# Patient Record
Sex: FEMALE | Race: WHITE | NOT HISPANIC OR LATINO | Employment: OTHER | URBAN - METROPOLITAN AREA
[De-identification: names, ages, dates, MRNs, and addresses within clinical notes are randomized per-mention and may not be internally consistent; named-entity substitution may affect disease eponyms.]

---

## 2017-04-02 ENCOUNTER — HOSPITAL ENCOUNTER (EMERGENCY)
Facility: MEDICAL CENTER | Age: 82
End: 2017-04-02
Attending: EMERGENCY MEDICINE
Payer: MEDICARE

## 2017-04-02 ENCOUNTER — APPOINTMENT (OUTPATIENT)
Dept: RADIOLOGY | Facility: MEDICAL CENTER | Age: 82
End: 2017-04-02
Attending: EMERGENCY MEDICINE
Payer: MEDICARE

## 2017-04-02 VITALS
DIASTOLIC BLOOD PRESSURE: 89 MMHG | RESPIRATION RATE: 17 BRPM | SYSTOLIC BLOOD PRESSURE: 114 MMHG | HEIGHT: 58 IN | TEMPERATURE: 99.9 F | HEART RATE: 62 BPM | WEIGHT: 120 LBS | BODY MASS INDEX: 25.19 KG/M2

## 2017-04-02 DIAGNOSIS — S69.91XA HAND INJURIES, RIGHT, INITIAL ENCOUNTER: ICD-10-CM

## 2017-04-02 DIAGNOSIS — S62.640A CLOSED NONDISPLACED FRACTURE OF PROXIMAL PHALANX OF RIGHT INDEX FINGER, INITIAL ENCOUNTER: ICD-10-CM

## 2017-04-02 PROCEDURE — 73130 X-RAY EXAM OF HAND: CPT | Mod: RT

## 2017-04-02 PROCEDURE — 99284 EMERGENCY DEPT VISIT MOD MDM: CPT

## 2017-04-02 RX ORDER — WARFARIN SODIUM 3 MG/1
3 TABLET ORAL EVERY EVENING
COMMUNITY

## 2017-04-02 RX ORDER — DONEPEZIL HYDROCHLORIDE 5 MG/1
5 TABLET, FILM COATED ORAL NIGHTLY
COMMUNITY

## 2017-04-02 RX ORDER — SIMVASTATIN 10 MG
10 TABLET ORAL NIGHTLY
COMMUNITY

## 2017-04-02 RX ORDER — MULTIVITAMIN/IRON/FOLIC ACID 18MG-0.4MG
1 TABLET ORAL DAILY
COMMUNITY

## 2017-04-02 RX ORDER — OMEPRAZOLE 40 MG/1
40 CAPSULE, DELAYED RELEASE ORAL DAILY
COMMUNITY

## 2017-04-02 RX ORDER — FOLIC ACID 1 MG/1
1 TABLET ORAL DAILY
COMMUNITY

## 2017-04-02 RX ORDER — FUROSEMIDE 20 MG/1
20 TABLET ORAL
COMMUNITY

## 2017-04-02 ASSESSMENT — PAIN SCALES - WONG BAKER: WONGBAKER_NUMERICALRESPONSE: HURTS JUST A LITTLE BIT

## 2017-04-02 ASSESSMENT — PAIN SCALES - GENERAL: PAINLEVEL_OUTOF10: 2

## 2017-04-02 NOTE — ED AVS SNAPSHOT
4/2/2017          Kim Monzon  [address]  [place] [address]    Dear Kim:    [facility] wants to ensure your discharge home is safe and you or your loved ones have had all your questions answered regarding your care after you leave the hospital.    You may receive a telephone call within two days of your discharge.  This call is to make certain you understand your discharge instructions as well as ensure we provided you with the best care possible during your stay with us.     The call will only last approximately 3-5 minutes and will be done by a nurse.    Once again, we want to ensure your discharge home is safe and that you have a clear understanding of any next steps in your care.  If you have any questions or concerns, please do not hesitate to contact us, we are here for you.  Thank you for choosing [facility] for your healthcare needs.    Sincerely,    Terry Faulkner    [facility]

## 2017-04-02 NOTE — ED AVS SNAPSHOT
[employer] Access Code: [email or web address]  Expires: 5/2/2017 11:51 AM    Your email address is not on file at [facility] [employer].  Email Addresses are required for you to sign up for [employer], please contact [phone] to verify your personal information and to provide your email address prior to attempting to register for [employer].    Kim Monzon  [address] [place] [address]    [employer]  A secure, online tool to manage your health information     [employer]’s [employer]® is a secure, online tool that connects you to your personalized health information from the privacy of your home -- day or night - making it very easy for you to manage your healthcare. Once the activation process is completed, you can even access your medical information using the [employer] césar, which is available for free in the Apple César store or Google Play store.     To learn more about [employer], visit [email or web address]    There are two levels of access available (as shown below):   My Chart Features  [facility] Primary Care Doctor [facility]  Specialists [facility]  Urgent  Care Non-[facility] Primary Care Doctor   Email your healthcare team securely and privately 24/7 X X X    Manage appointments: schedule your next appointment; view details of past/upcoming appointments X      Request prescription refills. X      View recent personal medical records, including lab and immunizations X X X X   View health record, including health history, allergies, medications X X X X   Read reports about your outpatient visits, procedures, consult and ER notes X X X X   See your discharge summary, which is a recap of your hospital and/or ER visit that includes your diagnosis, lab results, and care plan X X  X     How to register for [employer]:  Once your e-mail address has been verified, follow the following steps to sign up for [employer].     1. Go to  [email or web address]  2. Click on the Sign Up Now box, which takes you to the New Member Sign Up page. You will need  to provide the following information:  a. Enter your [employer] Access Code exactly as it appears at the top of this page. (You will not need to use this code after you’ve completed the sign-up process. If you do not sign up before the expiration date, you must request a new code.)   b. Enter your date of birth.   c. Enter your home email address.   d. Click Submit, and follow the next screen’s instructions.  3. Create a [employer] ID. This will be your [employer] login ID and cannot be changed, so think of one that is secure and easy to remember.  4. Create a [employer] password. You can change your password at any time.  5. Enter your Password Reset Question and Answer. This can be used at a later time if you forget your password.   6. Enter your e-mail address. This allows you to receive e-mail notifications when new information is available in [employer].  7. Click Sign Up. You can now view your health information.    For assistance activating your [employer] account, call [phone]

## 2017-04-02 NOTE — ED NOTES
"Med rec updated and complete  Allergies reviewed per daughter  Pt states \"My daughter knows my medications\".  Pt's daughter had a list of medications and her medications, went over list and returned list back to pts daughter.  Pts daughter states \"No antibiotics in the last 30 days\".        "

## 2017-04-02 NOTE — ED NOTES
Pt amb to rm#2; c/o R hand pain, bruising r/t t5000 hand injury. Pt 'jammed' her hand while putting on her shoes last night. Swelling bruising noted to 1st-2nd knuckles/fingers. Pt on coumadin tx

## 2017-04-02 NOTE — DISCHARGE INSTRUCTIONS
Finger Fracture  Finger fractures are breaks in the bones of the fingers. There are many types of fractures. There are also different ways of treating these fractures. Your doctor will talk with you about the best way to treat your fracture.  Injury is the main cause of broken fingers. This includes:  · Injuries while playing sports.  · Workplace injuries.  · Falls.  HOME CARE  · Follow your doctor's instructions for:  ¨ Activities.  ¨ Exercises.  ¨ Physical therapy.  · Take medicines only as told by your doctor for pain, discomfort, or fever.  GET HELP IF:  You have pain or swelling that limits:  · The motion of your fingers.  · The use of your fingers.  GET HELP RIGHT AWAY IF:  · You cannot feel your fingers, or your fingers become numb.     This information is not intended to replace advice given to you by your health care provider. Make sure you discuss any questions you have with your health care provider.     Document Released: 06/05/2009 Document Revised: 01/08/2016 Document Reviewed: 07/30/2014  Help.com Interactive Patient Education ©2016 [employer] Inc.  Return if fever, vomiting or if no better in 12 hours.

## 2017-04-02 NOTE — ED NOTES
Pt and family received d/c instr; pt and daughter verbalized understanding. Pt to lobby via w/c per daughter.

## 2017-04-02 NOTE — ED AVS SNAPSHOT
Home Care Instructions                                                                                                                Kim Monzon   MRN: [id number]    Department:  [facility], Emergency Dept   Date of Visit:  4/2/2017            [facility], Emergency Dept    [address]    [place] [address]    Phone:  [phone]      You were seen by     Damaso Easley M.D.      Your Diagnosis Was     Hand injuries, right, initial encounter     S69.91XA       Follow-up Information     1. Follow up with Yogi Jennings M.D.. Schedule an appointment as soon as possible for a visit today.    Specialty:  Orthopaedics    Contact information    [address]  [address]  [place] [address]  [phone]        Medication Information     Review all of your home medications and newly ordered medications with your primary doctor and/or pharmacist as soon as possible. Follow medication instructions as directed by your doctor and/or pharmacist.     Please keep your complete medication list with you and share with your physician. Update the information when medications are discontinued, doses are changed, or new medications (including over-the-counter products) are added; and carry medication information at all times in the event of emergency situations.               Medication List      ASK your doctor about these medications        Instructions    Morning Afternoon Evening Bedtime    B-12 PO        Take 1 Tab by mouth every day.   Dose:  1 Tab                        CENTRUM ADULTS Tabs tablet        Take 1 Tab by mouth every day.   Dose:  1 Tab                        donepezil 5 MG Tabs   Commonly known as:  ARICEPT        Take 5 mg by mouth every evening.   Dose:  5 mg                        folic acid 1 MG Tabs   Commonly known as:  FOLVITE        Take 1 mg by mouth every day.   Dose:  1 mg                        furosemide 20 MG Tabs   Commonly known as:  LASIX           Take 20 mg by mouth every 48 hours.   Dose:  20 mg                        metoprolol 25 MG Tabs   Commonly known as:  LOPRESSOR        Take 25 mg by mouth 2 times a day.   Dose:  25 mg                        omeprazole 40 MG delayed-release capsule   Commonly known as:  PRILOSEC        Take 40 mg by mouth every day.   Dose:  40 mg                        simvastatin 10 MG Tabs   Commonly known as:  ZOCOR        Take 10 mg by mouth every evening.   Dose:  10 mg                        warfarin 3 MG Tabs   Commonly known as:  COUMADIN        Take 3 mg by mouth every evening.   Dose:  3 mg                                Procedures and tests performed during your visit     DX-HAND 3+ RIGHT    NURSING COMMUNICATION        Discharge Instructions       Finger Fracture  Finger fractures are breaks in the bones of the fingers. There are many types of fractures. There are also different ways of treating these fractures. Your doctor will talk with you about the best way to treat your fracture.  Injury is the main cause of broken fingers. This includes:  · Injuries while playing sports.  · Workplace injuries.  · Falls.  HOME CARE  · Follow your doctor's instructions for:  ¨ Activities.  ¨ Exercises.  ¨ Physical therapy.  · Take medicines only as told by your doctor for pain, discomfort, or fever.  GET HELP IF:  You have pain or swelling that limits:  · The motion of your fingers.  · The use of your fingers.  GET HELP RIGHT AWAY IF:  · You cannot feel your fingers, or your fingers become numb.     This information is not intended to replace advice given to you by your health care provider. Make sure you discuss any questions you have with your health care provider.     Document Released: 06/05/2009 Document Revised: 01/08/2016 Document Reviewed: 07/30/2014  [employer] Interactive Patient Education ©2016 [employer].  Return if fever, vomiting or if no better in 12 hours.          Patient Information     Patient  Information    Following emergency treatment: all patient requiring follow-up care must return either to a private physician or a clinic if your condition worsens before you are able to obtain further medical attention, please return to the emergency room.     Billing Information    At [facility], we work to make the billing process streamlined for our patients.  Our Representatives are here to answer any questions you may have regarding your hospital bill.  If you have insurance coverage and have supplied your insurance information to us, we will submit a claim to your insurer on your behalf.  Should you have any questions regarding your bill, we can be reached online or by phone as follows:  Online: You are able pay your bills online or live chat with our representatives about any billing questions you may have. We are here to help Monday - Friday from 8:00am to 7:30pm and 9:00am - 12:00pm on Saturdays.  Please visit [email or web address]  for more information.   Phone:  [phone] or [phone]    Please note that your emergency physician, surgeon, pathologist, radiologist, anesthesiologist, and other specialists are not employed by [facility] and will therefore bill separately for their services.  Please contact them directly for any questions concerning their bills at the numbers below:     Emer[facility] Physician Services:  [phone]  [facility]:  [phone]  [facility]:  [phone]  [facility]:  [phone]    1. Your final bill may vary from the amount quoted upon discharge if all procedures are not complete at that time, or if your doctor has additional procedures of which we are not aware. You will receive an additional bill if you return to the Emergency Department at [facility] for suture removal regardless of the facility of which the sutures were placed.     2. Please arrange for settlement of this account  at the emergency registration.    3. All self-pay accounts are due in full at the time of treatment.  If you are unable to meet this obligation then payment is expected within 4-5 days.     4. If you have had radiology studies (CT, X-ray, Ultrasound, MRI), you have received a preliminary result during your emergency department visit. Please contact the radiology department [phone] to receive a copy of your final result. Please discuss the Final result with your primary physician or with the follow up physician provided.     Crisis Hotline:  [place] Crisis Hotline:  8-676-HQLSSSV or 1-256.762.8724  [place] Crisis Hotline:    1-207.209.7742 or 324-836-5184         ED Discharge Follow Up Questions    1. In order to provide you with very good care, we would like to follow up with a phone call in the next few days.  May we have your permission to contact you?     YES /  NO    2. What is the best phone number to call you? (       )_____-__________    3. What is the best time to call you?      Morning  /  Afternoon  /  Evening                   Patient Signature:  ____________________________________________________________    Date:  ____________________________________________________________

## 2017-04-02 NOTE — ED PROVIDER NOTES
"ED Provider Note    CHIEF COMPLAINT  Chief Complaint   Patient presents with   • T-5000 Extremity Pain     R hand       HPI  Kim Monzon is a 89 y.o. female who presents with right hand pain, when she slipped, last night, about 12 hours ago. Struck the right hand. No loss of consciousness no neck pain no other injuries. Only right hand pain.    REVIEW OF SYSTEMS  See HPI for further details. All other systems are negative.     PAST MEDICAL HISTORY  Past Medical History   Diagnosis Date   • Hypertension    • CAD (coronary artery disease)        FAMILY HISTORY  History reviewed. No pertinent family history.    SOCIAL HISTORY  Social History     Social History   • Marital Status:      Spouse Name: N/A   • Number of Children: N/A   • Years of Education: N/A     Social History Main Topics   • Smoking status: Never Smoker    • Smokeless tobacco: None   • Alcohol Use: No   • Drug Use: No   • Sexual Activity: Not Asked     Other Topics Concern   • None     Social History Narrative   • None       SURGICAL HISTORY  History reviewed. No pertinent past surgical history.    CURRENT MEDICATIONS  Home Medications     **Home medications have not yet been reviewed for this encounter**          ALLERGIES  Allergies   Allergen Reactions   • Codeine        PHYSICAL EXAM  VITAL SIGNS: /62 mmHg  Pulse 59  Temp(Src) 37.7 °C (99.9 °F)  Resp 14  Ht 1.473 m (4' 10\")  Wt 54.432 kg (120 lb)  BMI 25.09 kg/m2  Constitutional: Well developed, Well nourished, No acute distress, Non-toxic appearance.   Extremities: Intact distal pulses examination of her right hand, tenderness ecchymoses, secondary MP joint, neurovascular is intact skin is intact, No cyanosis, No clubbing.   Musculoskeletal: Good range of motion in all major joints except right hand as above     Neurologic: Alert & oriented x 3, Normal motor function, Normal sensory function, No focal deficits noted.   Psychiatric: Affect normal, Judgment normal, Mood normal. 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 (See chart for details)    She bumped her hand accidentally, last night, x-ray demonstrates a fracture, proximal portion of the proximal phalanx, index finger. She is placed in a splint. And will follow-up with the on-call orthopedic surgeon., Dr. Jennings  FINAL IMPRESSION  1.   1. Hand injuries, right, initial encounter        2. Fractured proximal phalanx, index finger, right hand.     Disposition  Discharge instructions are understood. This patient is to return if fever vomiting or no better in 12 hours. Follow up with the orthopedic surgeon, Dr. Jennings. Information sheets on finger fracture  Electronically signed by: Damaso Easley, 4/2/2017 10:27 AM